# Patient Record
Sex: MALE | Race: WHITE | HISPANIC OR LATINO
[De-identification: names, ages, dates, MRNs, and addresses within clinical notes are randomized per-mention and may not be internally consistent; named-entity substitution may affect disease eponyms.]

---

## 2019-02-05 PROBLEM — Z00.00 ENCOUNTER FOR PREVENTIVE HEALTH EXAMINATION: Status: ACTIVE | Noted: 2019-02-05

## 2021-10-28 ENCOUNTER — APPOINTMENT (OUTPATIENT)
Dept: ORTHOPEDIC SURGERY | Facility: CLINIC | Age: 61
End: 2021-10-28
Payer: COMMERCIAL

## 2021-10-28 DIAGNOSIS — M23.92 UNSPECIFIED INTERNAL DERANGEMENT OF LEFT KNEE: ICD-10-CM

## 2021-10-28 PROCEDURE — 73562 X-RAY EXAM OF KNEE 3: CPT | Mod: LT

## 2021-10-28 PROCEDURE — 99203 OFFICE O/P NEW LOW 30 MIN: CPT

## 2021-10-28 NOTE — DISCUSSION/SUMMARY
[Medication Risks Reviewed] : Medication risks reviewed [de-identified] : We discussed treatment options. He got success with acupuncture. He tried a couple more times. He'll apply ice twice a day he'll use over-the-counter medication. If the symptoms don't improve or get worse he'll let me know and then we will do an MRI. He would not have to come in to see me to do the MRI.

## 2021-10-28 NOTE — REVIEW OF SYSTEMS
[Negative] : Heme/Lymph [Arthralgia] : arthralgia [Joint Stiffness] : no joint stiffness [Joint Swelling] : no joint swelling

## 2021-10-28 NOTE — HISTORY OF PRESENT ILLNESS
[de-identified] : Location:left knee pain \par Quality: achy\par Duration: 2 weeks\par Context: not sure how it happened\par Aggravating Factors: sitting too long, going from sitting to standing, bending, going up and down steps \par Conservative treatment: rest, icing\par Associated Symptoms: swelling \par Prior Studies: n/a \par